# Patient Record
(demographics unavailable — no encounter records)

---

## 2024-10-24 NOTE — HISTORY OF PRESENT ILLNESS
[N] : Patient denies prior pregnancies [Menarche Age: ____] : age at menarche was [unfilled] [FreeTextEntry1] : 57-year-old G0, P0 presents with chief complaint of feeling something in the vagina, history of cystocele.  Denies worsening stress incontinence, vaginal bleeding or pelvic pain.  Mammogram is normal. [PGHxTotal] : 0 [PGHxFullTerm] : 0 [PGHxPremature] : 0 [PGHxAbortions] : 0 [facility]] : 0 [PGHxABInduced] : 0 [PGHxABSpont] : 0 [PGHxEctopic] : 0 [PGHxMultBirths] : 0

## 2024-10-24 NOTE — PHYSICAL EXAM
[Chaperone Present] : A chaperone was present in the examining room during all aspects of the physical examination [[id number]] : A chaperone was present during the pelvic exam. [FreeTextEntry2] : Natty [Appropriately responsive] : appropriately responsive [Alert] : alert [No Acute Distress] : no acute distress [No Lymphadenopathy] : no lymphadenopathy [Regular Rate Rhythm] : regular rate rhythm [No Murmurs] : no murmurs [Clear to Auscultation B/L] : clear to auscultation bilaterally [Soft] : soft [Non-tender] : non-tender [Non-distended] : non-distended [No HSM] : No HSM [No Lesions] : no lesions [No Mass] : no mass [Oriented x3] : oriented x3 [Examination Of The Breasts] : a normal appearance [No Masses] : no breast masses were palpable [Vulvar Atrophy] : vulvar atrophy [Labia Majora] : normal [Labia Minora] : normal [Atrophy] : atrophy [Cystocele] : a cystocele [Dry Mucosa] : dry mucosa [Normal] : normal [Uterine Adnexae] : normal [Declined] : Patient declined rectal exam

## 2024-10-24 NOTE — DISCUSSION/SUMMARY
[FreeTextEntry1] : 57-year-old G0, P0 presents with chief complaint of vaginal mass small with past history of cystocele.  She denies recent stress incontinence or pelvic pain.  Physical exam shows atrophic changes in the vulva vagina with small cystocele.  Pap GC chlamydia sent.  Mammogram reviewed as normal.  Patient is still using pelvic exercises to help with the cystocele and incontinence.  Patient to return in 1 year for follow-up. Safe sex discussed. Diet and exercises discussed.

## 2025-01-02 NOTE — PHYSICAL EXAM
[Chaperone Present] : A chaperone was present in the examining room during all aspects of the physical examination [[id number]] : A chaperone was present during the pelvic exam. [Normal Appearance] : general appearance was normal [3] : 3 [Aa ____] : Aa [unfilled] [Ba ____] : Ba [unfilled] [C ____] : C [unfilled] [GH ____] : GH [unfilled] [PB ____] : PB [unfilled] [TVL ____] : TVL  [unfilled] [Ap ____] : Ap [unfilled] [Bp ____] : Bp [unfilled] [D ____] : D [unfilled] [Normal] : no abnormalities [FreeTextEntry2] : Radha

## 2025-01-02 NOTE — DISCUSSION/SUMMARY
[FreeTextEntry1] : CHAYITO is a 57 year female who presents for f/u on pop, frequency and urgency. Stable POP, mild midline cystocele. I did not recommend any treatment. We did discuss what is involved with a pessary and surgery. I ordered a pelvic U/S because she has some lower abdominal cramping.  f/u for PTNS and with me if something changes.  All questions answered.

## 2025-01-02 NOTE — PHYSICAL EXAM
[Chaperone Present] : A chaperone was present in the examining room during all aspects of the physical examination [[id number]] : A chaperone was present during the pelvic exam. [Normal Appearance] : general appearance was normal [3] : 3 [Aa ____] : Aa [unfilled] [Ba ____] : Ba [unfilled] [C ____] : C [unfilled] [GH ____] : GH [unfilled] [PB ____] : PB [unfilled] [TVL ____] : TVL  [unfilled] [Ap ____] : Ap [unfilled] [Bp ____] : Bp [unfilled] [D ____] : D [unfilled] [Normal] : no abnormalities [FreeTextEntry2] : Radha

## 2025-01-02 NOTE — HISTORY OF PRESENT ILLNESS
[FreeTextEntry1] : CHAYITO is a 57 year female who presents for f/u on pop, frequency and urgency. Last seen for ptns maintenance booster with Emy on 12/3/24. Last seen 10/19/23 by me, referred to PFPT. Failed oxybutynin and myrbetriq. Got great relief of her OAB symptoms with PTNS. She is happy about this. She is here to check her prolapse. When she saw me in October 2023 she noticed the prolapse much more. Now she just feels it a little and it really does not bother her.   wnl Ua w/micro 12/3/24 Negative Ucx 12/3/24

## 2025-01-02 NOTE — ADDENDUM
[FreeTextEntry1] : This note was written by Tracey Mehta, acting as the  for Dr. Bates. This note accurately reflects the work and decisions made by Dr. Bates.

## 2025-02-04 NOTE — RESULTS/DATA
[TextEntry] : Night one HST on 4/13/18: ISIA=11.8 WP HST on 8/30/24: AHI=5.5: Severe snoring.  Poor sleep with frequent awakenings

## 2025-02-04 NOTE — DISCUSSION/SUMMARY
[FreeTextEntry1] : IMP  Mild EVA, moderate snoring, numerous awakenings and excessive sleepiness without her oral appliance. Better sleep and clinical outcome with oral appliance Bruxism Snoring  Plan  Await repair of new oral appliance Will order WP HST when dentist says it is time 3-month FU  - after new sleep test

## 2025-02-04 NOTE — HISTORY OF PRESENT ILLNESS
[TextBox_4] : 8/17/23 - Dr Boateng Patient has a history of mild obstructive sleep apnea with AHI of 12 with excessive daytime sleepiness.  She was treated with oral appliance therapy.  Some residual sleepiness was noted and a repeat sleep study showed residual AHI of 7.  The patient was sent back to Dr. Harvey for further adjustment of her oral appliance but he feels that she needs a new one as her current one is 5 years old and she may need a different design.  Therefore a repeat sleep study has been requested.  7/12/24  My first visit with this patient  I reviewed all recent notes, orders, lab results and images for 15 minutes on all available platforms (including [employer], [employer], [employer], and [employer] Epic prior to this visit and made notes.  56-year-old female Snoring, EDS, Bruxism Mild to moderate EVA Successfully treated with oral appliance in the past Appliance old and broken  Very poor sleep with frequent arousals without the oral appliance   2/4/25 Problems with new oral appliance Back at lab for repair

## 2025-02-04 NOTE — PHYSICAL EXAM
[Enlarged Base of the Tongue] : enlarged base of the tongue [Retrognathia] : retrognathia [II] : Mallampati Class: II

## 2025-03-26 NOTE — HISTORY OF PRESENT ILLNESS
[FreeTextEntry8] : Patient presents complaining of not feeling well as of yesterday.  Patient is complaining of congestion/runny nose, her left ear hurts and she has sore throat with mild cough.  Patient denies fever/COVID exposure, did not take a COVID test.  Patient states she does feel little bit better today, she did try OTC medication

## 2025-03-26 NOTE — PHYSICAL EXAM
[No Acute Distress] : no acute distress [No Respiratory Distress] : no respiratory distress  [Clear to Auscultation] : lungs were clear to auscultation bilaterally [Normal Rate] : normal rate  [Regular Rhythm] : with a regular rhythm [Normal Affect] : the affect was normal [Normal Mood] : the mood was normal [Normal Outer Ear/Nose] : the outer ears and nose were normal in appearance [Normal Oropharynx] : the oropharynx was normal [Normal TMs] : both tympanic membranes were normal [Normal Nasal Mucosa] : the nasal mucosa was normal [Supple] : supple [de-identified] : No pinnal/tragal pain

## 2025-03-26 NOTE — ASSESSMENT
[FreeTextEntry1] : Etiology appears viral, rapid strep is negative.  Viral swab sent (not appearing in note).  Patient adamantly feels she needs antibiotic, Augmentin x 10 days given.Patient advised to rest/increase fluids and use supportive therapy. Patient will call if symptoms persist or worsen and return to the office as scheduled for regular followup.   Dr. Zhou was present in office building while I examined patient

## 2025-04-22 NOTE — DISCUSSION/SUMMARY
[FreeTextEntry1] : 57-year-old G0, P0 presents with bilateral breast tenderness.  Breast sonogram and mammogram of October 2024 were normal.  Patient denies breast discharge or masses.  Breast sonogram ordered.  Return in 4 weeks for follow-up and should the pain persist consider referral to breast specialist.

## 2025-04-22 NOTE — PHYSICAL EXAM
[Chaperoned Physical Exam] : A chaperone was present in the examining room during all aspects of the physical examination. [MA] : MA [FreeTextEntry2] : Ntaty [FreeTextEntry6] : Bilateral breast exams were normal.

## 2025-04-22 NOTE — HISTORY OF PRESENT ILLNESS
[FreeTextEntry1] : 57-year-old G0, P0 presents with right breast tenderness and questionable cyst which has both disappeared.  Also had breast pain on the left which has also disappeared.  Mammogram and breast sonogram of October 2024 was normal.  No breast discharge.

## 2025-05-19 NOTE — HISTORY OF PRESENT ILLNESS
[FreeTextEntry1] : 57-year-old patient presents for follow-up care.  She states that her breast pain has subsided and breast sonogram of this month is benign.  Patient is feeling much better and is happy to forward.

## 2025-05-19 NOTE — DISCUSSION/SUMMARY
[FreeTextEntry1] : 57-year-old G0, P0 presented with right breast discomfort which has resolved.  Breast sonogram of May 2025 was normal.  Patient was reassured and will return in 6 months for follow-up.  All questions answered.

## 2025-06-03 NOTE — HISTORY OF PRESENT ILLNESS
[de-identified] : 57-year-old female presents for her annual wellness visit. She was first seen in this office May 2022.  The patient general health is good with only chronic issue being hyperthyroidism for which she follows with endocrinology and is on methimazole.  History also includes hyperlipidemia with fairly high cholesterol with last being at 285 with LDL at 187.  Patient was referred for coronary calcium CAT scan which she did June 2025 with a score of 0.  Therefore feel no need to treat with a statin but encourage lifestyle changes.  History of GERD improved with dietary changes.  Also history of obstructive sleep apnea for which she uses an oral appliance secondary to CPAP intolerance. She saw the sleep specialist secondary to feeling that the oral appliance is not doing an adequate job therefore she states she got a new oral appliance but still feels is not working adequately and not getting a good night sleep.  She states she has follow-up sleep study next week.  She does have chronic urinary issues including incontinence and frequency and has seen urogynecology   Overall she feels well without chest pain, palpitations, shortness of breath or edema. Her main complaint is fatigue which continues.  She was seen here April 25, 2023 complaining of GI symptoms after having been in her home [place] [facility] [place] concern for possible infections.  At the time of her visit she was about 90% better.  Stool culture and for O&P was sent which were negative.  Essentially resolved symptoms.  The patient is single and never  and has no children. 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 This was very stressful on her. 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

## 2025-06-03 NOTE — ASSESSMENT
[Vaccines Reviewed] : Immunizations reviewed today. Please see immunization details in the vaccine log within the immunization flowsheet.  [FreeTextEntry1] : 57-year-old female with good general health with only chronic medical issue being hyperthyroidism for which she is on methimazole and follow with endocrinology.  Previously had chronic GERD which has improved with dietary changes.  EVA intolerant to CPAP and using a new oral appliance but patient feels it is still not helping and not getting an adequate night sleep.  Follow-up sleep study via the sleep specialist scheduled.  She does have hyperlipidemia with increased cardiovascular risks therefore referred for coronary calcium CAT scan which she did June 2025 with score of 0.  As a result statin therapy not recommended but encouraged lifestyle changes.  Chronic urinary issues therefore followup with urogynecology as scheduled.  Fatigue likely secondary to inadequate sleep but metabolic profile will be done.  Mammography October 2024 Colonoscopy March 2018 with followup in 10 years Gynecology up-to-date Bone density has never had therefore again referral given  Patient declines all vaccines including influenza, Shingrix, COVID and Tdap  Specialists Involved: -Endo: Dr. Almonte ([phone]) -OBGYN: Dr. Maria -Pulm: Dr. Mendoza -GI: Dr. Sandy  Venipuncture done today in the office Followup yearly and as needed

## 2025-06-03 NOTE — HEALTH RISK ASSESSMENT
[Good] : ~his/her~  mood as  good [No] : In the past 12 months have you used drugs other than those required for medical reasons? No [0] : 2) Feeling down, depressed, or hopeless: Not at all (0) [PHQ-2 Negative - No further assessment needed] : PHQ-2 Negative - No further assessment needed [Never] : Never [Patient reported mammogram was normal] : Patient reported mammogram was normal [Patient reported PAP Smear was normal] : Patient reported PAP Smear was normal [Patient reported colonoscopy was normal] : Patient reported colonoscopy was normal [HIV test declined] : HIV test declined [Hepatitis C test declined] : Hepatitis C test declined [Alone] : lives alone [Employed] : employed [College] : College [Single] : single [] :  [# Of Children ___] : has [unfilled] children [Fully functional (bathing, dressing, toileting, transferring, walking, feeding)] : Fully functional (bathing, dressing, toileting, transferring, walking, feeding) [Fully functional (using the telephone, shopping, preparing meals, housekeeping, doing laundry, using] : Fully functional and needs no help or supervision to perform IADLs (using the telephone, shopping, preparing meals, housekeeping, doing laundry, using transportation, managing medications and managing finances) [Smoke Detector] : smoke detector [Carbon Monoxide Detector] : carbon monoxide detector [Seat Belt] :  uses seat belt [None] : Patient does not have any barriers to medication adherence [Yes] : Reviewed medication list for presence of high-risk medications. [NO] : No [Audit-CScore] : 0 [de-identified] : active [de-identified] : good [TDS0Hejpw] : 0 [Change in mental status noted] : No change in mental status noted [Sexually Active] : not sexually active [High Risk Behavior] : no high risk behavior [Reports changes in hearing] : Reports no changes in hearing [Reports changes in vision] : Reports no changes in vision [Sunscreen] : does not use sunscreen [MammogramDate] : 10/22 [PapSmearDate] : 10/22 [ColonoscopyDate] : 03/18 [AdvancecareDate] : 05/23

## 2025-06-03 NOTE — PHYSICAL EXAM
[No Acute Distress] : no acute distress [Well Nourished] : well nourished [Well Developed] : well developed [Well-Appearing] : well-appearing [Normal Sclera/Conjunctiva] : normal sclera/conjunctiva [PERRL] : pupils equal round and reactive to light [EOMI] : extraocular movements intact [Normal Outer Ear/Nose] : the outer ears and nose were normal in appearance [Normal Oropharynx] : the oropharynx was normal [No JVD] : no jugular venous distention [No Lymphadenopathy] : no lymphadenopathy [Supple] : supple [Thyroid Normal, No Nodules] : the thyroid was normal and there were no nodules present [No Respiratory Distress] : no respiratory distress  [No Accessory Muscle Use] : no accessory muscle use [Clear to Auscultation] : lungs were clear to auscultation bilaterally [Normal Rate] : normal rate  [Regular Rhythm] : with a regular rhythm [Normal S1, S2] : normal S1 and S2 [No Murmur] : no murmur heard [No Carotid Bruits] : no carotid bruits [No Abdominal Bruit] : a ~M bruit was not heard ~T in the abdomen [No Varicosities] : no varicosities [Pedal Pulses Present] : the pedal pulses are present [No Edema] : there was no peripheral edema [No Palpable Aorta] : no palpable aorta [No Extremity Clubbing/Cyanosis] : no extremity clubbing/cyanosis [Soft] : abdomen soft [Non Tender] : non-tender [Non-distended] : non-distended [No Masses] : no abdominal mass palpated [No HSM] : no HSM [Normal Bowel Sounds] : normal bowel sounds [No CVA Tenderness] : no CVA  tenderness [No Spinal Tenderness] : no spinal tenderness [No Joint Swelling] : no joint swelling [Grossly Normal Strength/Tone] : grossly normal strength/tone [No Rash] : no rash [Coordination Grossly Intact] : coordination grossly intact [No Focal Deficits] : no focal deficits [Normal Gait] : normal gait [Deep Tendon Reflexes (DTR)] : deep tendon reflexes were 2+ and symmetric [Normal Affect] : the affect was normal [Normal Insight/Judgement] : insight and judgment were intact [de-identified] : by Gyn

## 2025-07-17 NOTE — RESULTS/DATA
[TextEntry] : Night one HST on 4/13/18: ISAI=11.8 WP HST on 8/30/24: AHI=5.5: Severe snoring.  Poor sleep with frequent awakenings  WP HST 6/12/25: AHI=14.2; Supine AHI=24.6

## 2025-07-17 NOTE — DISCUSSION/SUMMARY
[FreeTextEntry1] : IMP  Mild EVA, moderate snoring, numerous awakenings and excessive sleepiness without her oral appliance. Better sleep and clinical outcome with first oral appliance Bruxism Snoring  Plan  Await replacement of new oral appliance with a new older model Will order WP HST when dentist says it is time Of note, on review every study showed no significant eva when the patient sleeps in the non-supine positions Strongly encouraged positional therapy Resume oral appliance use when new device available Call for me to order a new WP HST with the new oral appliance when it is in stable use 6-month FU

## 2025-07-17 NOTE — HISTORY OF PRESENT ILLNESS
[TextBox_4] : 8/17/23 - Dr Boateng Patient has a history of mild obstructive sleep apnea with AHI of 12 with excessive daytime sleepiness.  She was treated with oral appliance therapy.  Some residual sleepiness was noted and a repeat sleep study showed residual AHI of 7.  The patient was sent back to Dr. Harvey for further adjustment of her oral appliance but he feels that she needs a new one as her current one is 5 years old and she may need a different design.  Therefore a repeat sleep study has been requested.  7/12/24  My first visit with this patient  I reviewed all recent notes, orders, lab results and images for 15 minutes on all available platforms (including [employer], [employer], and [email or web address] Epic prior to this visit and made notes.  56-year-old female Snoring, EDS, Bruxism Mild to moderate EVA Successfully treated with oral appliance in the past Appliance old and broken  Very poor sleep with frequent arousals without the oral appliance   2/4/25 Problems with new oral appliance Back at lab for repair  7/17/25 No better with new different oral appliance the patient doesn't like Trying to get a new older model

## 2025-07-23 NOTE — ASSESSMENT
[FreeTextEntry1] : Patient with significant hyperlipidemia with cholesterol greater than 300 and LDL greater than 200 with puts her at risk for cardiovascular event such as heart attack and stroke.  Coronary calcium CAT scan score of 0. I discussed with the patient increased risks with hyperlipidemia therefore rationalization to treat. She agrees to continue rosuvastatin at 10 mg daily. Venipuncture done today in the office to check lipids and hepatic profile.   Follow-up in 3 months.

## 2025-07-23 NOTE — HISTORY OF PRESENT ILLNESS
[FreeTextEntry1] : Follow-up cardiac evaluation and hyperlipidemia [de-identified] : The patient presents secondary to hyperlipidemia with cholesterol greater than 300 and, as recommended, she saw cardiology June 18, 2025.  She had had a coronary calcium CAT scan score of 0.  Despite this cardiology felt, as I did, that her significant hyperlipidemia should be treated which the patient agreed to do. Therefore cardiology started her on rosuvastatin 20 mg daily.  She states after 1-2 doses it caused her stomach upset therefore she has been taking 20 mg 1/2 tablet daily with better tolerance. She had multiple questions about the medication including side effects, effects on kidneys and/or liver. Also questions about why cholesterol should be treated.

## 2025-07-23 NOTE — HISTORY OF PRESENT ILLNESS
[FreeTextEntry1] : Follow-up cardiac evaluation and hyperlipidemia [de-identified] : The patient presents secondary to hyperlipidemia with cholesterol greater than 300 and, as recommended, she saw cardiology June 18, 2025.  She had had a coronary calcium CAT scan score of 0.  Despite this cardiology felt, as I did, that her significant hyperlipidemia should be treated which the patient agreed to do. Therefore cardiology started her on rosuvastatin 20 mg daily.  She states after 1-2 doses it caused her stomach upset therefore she has been taking 20 mg 1/2 tablet daily with better tolerance. She had multiple questions about the medication including side effects, effects on kidneys and/or liver. Also questions about why cholesterol should be treated.